# Patient Record
Sex: FEMALE | Race: WHITE | NOT HISPANIC OR LATINO | Employment: FULL TIME | URBAN - METROPOLITAN AREA
[De-identification: names, ages, dates, MRNs, and addresses within clinical notes are randomized per-mention and may not be internally consistent; named-entity substitution may affect disease eponyms.]

---

## 2023-01-07 ENCOUNTER — APPOINTMENT (OUTPATIENT)
Dept: GENERAL RADIOLOGY | Facility: CLINIC | Age: 63
End: 2023-01-07
Attending: EMERGENCY MEDICINE
Payer: COMMERCIAL

## 2023-01-07 ENCOUNTER — HOSPITAL ENCOUNTER (EMERGENCY)
Facility: CLINIC | Age: 63
Discharge: HOME | End: 2023-01-08
Attending: EMERGENCY MEDICINE | Admitting: EMERGENCY MEDICINE
Payer: COMMERCIAL

## 2023-01-07 VITALS
BODY MASS INDEX: 23.52 KG/M2 | OXYGEN SATURATION: 98 % | DIASTOLIC BLOOD PRESSURE: 92 MMHG | RESPIRATION RATE: 16 BRPM | WEIGHT: 137.79 LBS | SYSTOLIC BLOOD PRESSURE: 143 MMHG | HEART RATE: 75 BPM | TEMPERATURE: 97.9 F | HEIGHT: 64 IN

## 2023-01-07 DIAGNOSIS — S52.501A CLOSED FRACTURE OF DISTAL END OF RIGHT RADIUS, UNSPECIFIED FRACTURE MORPHOLOGY, INITIAL ENCOUNTER: ICD-10-CM

## 2023-01-07 PROCEDURE — 99285 EMERGENCY DEPT VISIT HI MDM: CPT | Mod: 25

## 2023-01-07 PROCEDURE — 25605 CLTX DST RDL FX/EPHYS SEP W/: CPT | Mod: RT

## 2023-01-07 PROCEDURE — 73110 X-RAY EXAM OF WRIST: CPT | Mod: LT

## 2023-01-07 RX ORDER — LIDOCAINE HYDROCHLORIDE 10 MG/ML
INJECTION, SOLUTION EPIDURAL; INFILTRATION; INTRACAUDAL; PERINEURAL
Status: DISCONTINUED
Start: 2023-01-07 | End: 2023-01-08 | Stop reason: HOSPADM

## 2023-01-07 RX ORDER — OXYCODONE HYDROCHLORIDE 5 MG/1
5 TABLET ORAL EVERY 6 HOURS PRN
Qty: 12 TABLET | Refills: 0 | Status: SHIPPED | OUTPATIENT
Start: 2023-01-07 | End: 2023-01-10

## 2023-01-07 ASSESSMENT — ACTIVITIES OF DAILY LIVING (ADL)
ADLS_ACUITY_SCORE: 35
ADLS_ACUITY_SCORE: 35

## 2023-01-08 PROCEDURE — 250N000013 HC RX MED GY IP 250 OP 250 PS 637: Performed by: EMERGENCY MEDICINE

## 2023-01-08 RX ORDER — OXYCODONE HYDROCHLORIDE 5 MG/1
5 TABLET ORAL ONCE
Status: COMPLETED | OUTPATIENT
Start: 2023-01-08 | End: 2023-01-08

## 2023-01-08 RX ADMIN — OXYCODONE HYDROCHLORIDE 5 MG: 5 TABLET ORAL at 00:05

## 2023-01-08 NOTE — ED TRIAGE NOTES
Walking dog, slipped on ice, fell on left wrist. Denies hitting head or LOC. Went to [facility]. Sent here for reduction. Got tylenol and toradol at . CMS intact.

## 2023-01-08 NOTE — ED PROVIDER NOTES
"  History     Chief Complaint:  Wrist Injury       HPI   Dina Madden is a 62 year old female who presents with left wrist injury sustained while walking her dog and falling tonight.  She was seen at [facility] and sent here for reduction due to the fracture.  She is right-hand dominant.  Last p.o. was several hours ago.  She denies hitting her head or having any other injuries. XR from  was sent via PACS system      Independent Historian: yes     Review of External Notes: yes     ROS:  Review of Systems  Please see HPI. All other systems reviewed and negative.      Allergies:  No Known Allergies     Medications:    Lexapro    Past Medical History:    Depression    Past Surgical History:    None    Family History:    None    Social History:     Here with spouse    Physical Exam     Patient Vitals for the past 24 hrs:   BP Temp Pulse Resp SpO2 Height Weight   01/07/23 2049 (!) 143/92 97.9  F (36.6  C) 75 16 98 % 1.626 m (5' 4\") 62.5 kg (137 lb 12.6 oz)        Physical Exam  General- alert, cooperative  Pulm- normal respiratory effort, no respiratory distress  Msk- RUE: 2+ pulses, sensation to light touch intact, no wounds or abrasions   ROM normal without difficulty, 5/5 strength           LUE: wrist with deformity and ecchymosis. Able to move fingers normally.  Nontender in forearm and elbow2+ pulses, sensation to light touch intact,  no wounds or abrasions   ROM normal without difficulty, 5/5 strength  Skin- no cyanosis or edema, no swelling, no rash or petechiae  Psych- normal mood and affect, normal behavior               Emergency Department Course     Imaging:  XR Wrist Left G/E 3 Views   Final Result   IMPRESSION: Splint material has been applied. Redemonstrated impacted distal left radius fracture with some persistent dorsal displacement and angulation on the lateral image.         Report per radiology      Procedures   Procedure Note: Hematoma block:  PROCEDURE: Hematoma block left distal " radius fracture    INDICATION:  Fracture distal radius    PHYSICIAN:  Froylan Urbina MD  DESCRIPTION OF PROCEDURE:    Informed consent. Site was correctly identified. Anesthesia was obtained with 3cc of 1% lidocaine without epinephrine, local infiltration.  Using a 18 gauge needle in standard fashion the distal radius fracture site was entered and 8cc of lidoccaine introduced.   Bandaid over site after.  Patient tolerated the procedure well, no complications.            Fracture Reduction     Procedure: Fracture Reduction     Indication: Fracture    Location: Left wrist    Consent: Written from Patient   Risks (including but not limited to: neurologic compromise, vascular injury, pain, and inability to reduce fracture), benefits, and alternatives were discussed with patient and consent for procedure was obtained.     Universal Protocol: Universal protocol was followed and time out conducted just prior to starting procedure, confirming patient identity, site/side, procedure, patient position, and availability of correct equipment and implants.     Anesthesia/Sedation: Lidocaine - 1%    Procedure Detail: Finger traps and 5 pound weight were used as traction to improve alignment of the fracture.    Immobilized with: Custom splint (See separate procedure note)  Post-reduction x-ray showed improved alignment  Post-procedure distal neurovascular function was intact.     Patient Status:  The patient tolerated the procedure well: Yes. There were no complications.      Splint Placement     Procedure: Splint Placement     Indication: Fracture    Consent: Verbal     Location: Left Wrist    Preparation: Wounds were cleansed and dressed with a non-adherent bandage     Procedure detail:   Splint was applied by Tech/Nurse with my assistance  Splint type: Sugar-tong   Splint materilal: Fiberglass  After placement I checked and adjusted the fit as needed to ensure proper positioning/fit   Sensation and circulation are intact after  splint placement     Patient Status: The patient tolerated the procedure well: Yes. There were no complications.        Emergency Department Course & Assessments:       Interventions:  Medications   lidocaine (PF) (XYLOCAINE) 1 % injection (has no administration in time range)        Independent Interpretation (X-rays, CTs, rhythm strip):  Reviewed outside XR    Consultations/Discussion of Management or Tests:  N/A    Social Determinants of Health affecting care:  N/A    Disposition:  The patient was discharged to home.     Impression & Plan        Medical Decision Making:  Patient presents for [place] urgent [facility] after falling and injuring her left wrist.  I reviewed outside x-ray, and it did show a distal radius fracture that is impacted.  Patient agreed to have hematoma block along with finger traps and traction to try to reduce the impacted fracture.  She did wanted any complications.  We were able to lengthen out the fracture and improve alignment somewhat but is still does need to have surgical fixation.  She is aware that she needs to follow-up with [facility] for further evaluation including surgical fixation.  She is placed in a splint.  She is sent home with oxycodone for pain as needed.  She is given splint care instructions.  Return precautions provided.  She will follow-up in 2 days with orthopedic for consultation.    Diagnosis:    ICD-10-CM    1. Closed fracture of distal end of right radius, unspecified fracture morphology, initial encounter  S52.501A            Discharge Medications:  New Prescriptions    OXYCODONE (ROXICODONE) 5 MG TABLET    Take 1 tablet (5 mg) by mouth every 6 hours as needed for pain      Scribe Disclosure:  Haley MONSIVAIS, am serving as a scribe at 11:18 PM on 1/7/2023 to document services personally performed by Froylan Urbina MD based on my observations and the provider's statements to me.    1/7/2023   Froylan Urbina MD Cheng, Wenlan, MD  01/08/23  0004

## 2023-01-08 NOTE — DISCHARGE INSTRUCTIONS
Motrin and tylenol for pain  Leave splint on  Oxycodone for pain  Follow up with [facility] on Monday

## 2023-09-12 ENCOUNTER — HOSPITAL ENCOUNTER (EMERGENCY)
Facility: CLINIC | Age: 63
Discharge: HOME | End: 2023-09-12
Attending: EMERGENCY MEDICINE | Admitting: EMERGENCY MEDICINE
Payer: OTHER MISCELLANEOUS

## 2023-09-12 VITALS
TEMPERATURE: 97 F | OXYGEN SATURATION: 100 % | DIASTOLIC BLOOD PRESSURE: 92 MMHG | SYSTOLIC BLOOD PRESSURE: 136 MMHG | HEART RATE: 79 BPM | RESPIRATION RATE: 20 BRPM

## 2023-09-12 DIAGNOSIS — H16.002 CORNEAL ULCER OF LEFT EYE: ICD-10-CM

## 2023-09-12 PROCEDURE — 99283 EMERGENCY DEPT VISIT LOW MDM: CPT

## 2023-09-12 PROCEDURE — 250N000013 HC RX MED GY IP 250 OP 250 PS 637: Performed by: EMERGENCY MEDICINE

## 2023-09-12 PROCEDURE — 250N000009 HC RX 250: Performed by: EMERGENCY MEDICINE

## 2023-09-12 PROCEDURE — 250N000009 HC RX 250

## 2023-09-12 RX ORDER — IBUPROFEN 600 MG/1
600 TABLET, FILM COATED ORAL ONCE
Status: COMPLETED | OUTPATIENT
Start: 2023-09-12 | End: 2023-09-12

## 2023-09-12 RX ORDER — TETRACAINE HYDROCHLORIDE 5 MG/ML
2 SOLUTION OPHTHALMIC ONCE
Status: DISCONTINUED | OUTPATIENT
Start: 2023-09-12 | End: 2023-09-12

## 2023-09-12 RX ORDER — TETRACAINE HYDROCHLORIDE 5 MG/ML
2 SOLUTION OPHTHALMIC ONCE
Status: COMPLETED | OUTPATIENT
Start: 2023-09-12 | End: 2023-09-12

## 2023-09-12 RX ADMIN — IBUPROFEN 600 MG: 600 TABLET, FILM COATED ORAL at 11:50

## 2023-09-12 RX ADMIN — FLUORESCEIN SODIUM 1 MG: 1 STRIP OPHTHALMIC at 12:10

## 2023-09-12 RX ADMIN — TETRACAINE HYDROCHLORIDE 2 DROP: 5 SOLUTION/ DROPS OPHTHALMIC at 12:09

## 2023-09-12 ASSESSMENT — VISUAL ACUITY
OD: 20/25
OS: 20/30

## 2023-09-12 ASSESSMENT — ACTIVITIES OF DAILY LIVING (ADL): ADLS_ACUITY_SCORE: 35

## 2023-09-12 NOTE — ED TRIAGE NOTES
Pt arrives for a chemical exposure to her L eye. States she was mixing paint when the order eliminator additive sprayed into her eye. She irrigated her eye. Still 10/10 pain. Vision blurry and cloudy. ABCs intact.    BP (!) 136/92   Pulse 79   Temp 97  F (36.1  C)   Resp 20   SpO2 100%        Triage Assessment       Row Name 09/12/23 1131       Triage Assessment (Adult)    Airway WDL WDL       Respiratory WDL    Respiratory WDL WDL       Peripheral/Neurovascular WDL    Peripheral Neurovascular WDL WDL

## 2023-09-12 NOTE — ED PROVIDER NOTES
History     Chief Complaint:  Eye Injury       HPI   Dina Madden is a 63 year old female baseline with up-to-date tetanus who presents emergency department concerns for a work-related injury in which a chemical called Marybel PISANO used as a paint odor eliminator was sprayed in her eye.  She noticed immediate burning pain.  She notes she flushed her eye with multiple saline bottles and shower.  She notes she still has burning and blurry vision with tearing.  She denies any other injuries with the exposure.      Independent Historian:   None - Patient Only    Review of External Notes:   Outpatient notes reviewed.  None pertinent to today's evaluation.      Medications:    No daily medications reported.    Past Medical History:    No chronic medical problems.      Physical Exam   Patient Vitals for the past 24 hrs:   BP Temp Pulse Resp SpO2   09/12/23 1130 (!) 136/92 97  F (36.1  C) 79 20 100 %        Physical Exam  General: Adult female sitting upright  Eyes: PERRL.  Conjunctive on the left is mildly injected. Clear tearing present. No visible foreign body. No edema. No visible hyphema or hypopyon. Under Woods lamp with fluorescein dye applied, there is a nearly full corneal abrasion/ulceration.  Negative Rey test.  MSK: Ambulatory  Skin: Warm and dry. No periorbital edema or significant erythema.   Neuro: Alert and oriented. Responds appropriately to all questions and commands.   Psych: Normal mood and affect. Pleasant.     Emergency Department Course       Emergency Department Course & Assessments:      Interventions:  Medications   ibuprofen (ADVIL/MOTRIN) tablet 600 mg (has no administration in time range)   tetracaine (PONTOCAINE) 0.5 % ophthalmic solution 2 drop (has no administration in time range)        Assessments:  I performed an exam of the patient and obtained history, as documented above.   I reassessed the patient. She denies any new concerns.     Independent Interpretation (X-rays, CTs, rhythm  strip):  None    Consultations/Discussion of Management or Tests:  I consulted with Dr. Rosas of ophthalmology who noted she would be happy to see the patient immediately after discharge from the ED.    Social Determinants of Health affecting care:   None    Disposition:  The patient was discharged to home.     Impression & Plan      Medical Decision Making:  Dina Madden is a 63-year-old female tetanus up-to-date who presents emergency room with a work-related chemical eye exposure.  She is noted on examination to have a nearly complete corneal abrasion versus ulceration.  No blunt trauma to the eye.  I I discussed her care with ophthalmology who felt the patient could come to the clinic at this time for full assessment of the corneal abnormality and chemical exposure.  The patient was agreeable to this plan.  She was given the address of the clinic and will go there immediately.      Diagnosis:    ICD-10-CM    1. Corneal ulcer of left eye  H16.002            9/12/2023   Lucy Merritt MD Jonkman, Tracy Dianne, MD  09/12/23 1506